# Patient Record
Sex: FEMALE | Race: WHITE | NOT HISPANIC OR LATINO | Employment: OTHER | URBAN - METROPOLITAN AREA
[De-identification: names, ages, dates, MRNs, and addresses within clinical notes are randomized per-mention and may not be internally consistent; named-entity substitution may affect disease eponyms.]

---

## 2017-02-10 ENCOUNTER — HOSPITAL ENCOUNTER (INPATIENT)
Facility: HOSPITAL | Age: 52
LOS: 1 days | Discharge: HOME | DRG: 392 | End: 2017-02-11
Attending: EMERGENCY MEDICINE | Admitting: HOSPITALIST
Payer: COMMERCIAL

## 2017-02-10 ENCOUNTER — APPOINTMENT (EMERGENCY)
Dept: CT IMAGING | Facility: HOSPITAL | Age: 52
DRG: 392 | End: 2017-02-10
Payer: COMMERCIAL

## 2017-02-10 ENCOUNTER — APPOINTMENT (EMERGENCY)
Dept: RADIOLOGY | Facility: HOSPITAL | Age: 52
DRG: 392 | End: 2017-02-10
Payer: COMMERCIAL

## 2017-02-10 DIAGNOSIS — R10.9 ABDOMINAL PAIN: Primary | ICD-10-CM

## 2017-02-10 PROBLEM — K52.9 ENTERITIS: Status: ACTIVE | Noted: 2017-02-10

## 2017-02-10 LAB
ALBUMIN SERPL BCP-MCNC: 3.8 G/DL (ref 3.5–5)
ALP SERPL-CCNC: 62 U/L (ref 46–116)
ALT SERPL W P-5'-P-CCNC: 23 U/L (ref 12–78)
ANION GAP SERPL CALCULATED.3IONS-SCNC: 16 MMOL/L (ref 4–13)
AST SERPL W P-5'-P-CCNC: 36 U/L (ref 5–45)
ATRIAL RATE: 58 BPM
BACTERIA UR QL AUTO: ABNORMAL /HPF
BASOPHILS # BLD AUTO: 0.04 THOUSANDS/ΜL (ref 0–0.1)
BASOPHILS NFR BLD AUTO: 0 % (ref 0–1)
BILIRUB SERPL-MCNC: 0.56 MG/DL (ref 0.2–1)
BILIRUB UR QL STRIP: NEGATIVE
BUN SERPL-MCNC: 16 MG/DL (ref 5–25)
CALCIUM SERPL-MCNC: 9 MG/DL (ref 8.3–10.1)
CHLORIDE SERPL-SCNC: 101 MMOL/L (ref 100–108)
CLARITY UR: CLEAR
CO2 SERPL-SCNC: 18 MMOL/L (ref 21–32)
COLOR UR: YELLOW
CREAT SERPL-MCNC: 0.6 MG/DL (ref 0.6–1.3)
EOSINOPHIL # BLD AUTO: 0.13 THOUSAND/ΜL (ref 0–0.61)
EOSINOPHIL NFR BLD AUTO: 1 % (ref 0–6)
ERYTHROCYTE [DISTWIDTH] IN BLOOD BY AUTOMATED COUNT: 13.4 % (ref 11.6–15.1)
GFR SERPL CREATININE-BSD FRML MDRD: >60 ML/MIN/1.73SQ M
GLUCOSE SERPL-MCNC: 130 MG/DL (ref 65–140)
GLUCOSE UR STRIP-MCNC: NEGATIVE MG/DL
HCT VFR BLD AUTO: 40.5 % (ref 34.8–46.1)
HGB BLD-MCNC: 14.3 G/DL (ref 11.5–15.4)
HGB UR QL STRIP.AUTO: ABNORMAL
KETONES UR STRIP-MCNC: ABNORMAL MG/DL
LACTATE SERPL-SCNC: 1.7 MMOL/L (ref 0.5–2)
LACTATE SERPL-SCNC: 2.5 MMOL/L (ref 0.5–2)
LEUKOCYTE ESTERASE UR QL STRIP: NEGATIVE
LIPASE SERPL-CCNC: 139 U/L (ref 73–393)
LYMPHOCYTES # BLD AUTO: 2.43 THOUSANDS/ΜL (ref 0.6–4.47)
LYMPHOCYTES NFR BLD AUTO: 21 % (ref 14–44)
MCH RBC QN AUTO: 32.1 PG (ref 26.8–34.3)
MCHC RBC AUTO-ENTMCNC: 35.3 G/DL (ref 31.4–37.4)
MCV RBC AUTO: 91 FL (ref 82–98)
MONOCYTES # BLD AUTO: 0.58 THOUSAND/ΜL (ref 0.17–1.22)
MONOCYTES NFR BLD AUTO: 5 % (ref 4–12)
NEUTROPHILS # BLD AUTO: 8.38 THOUSANDS/ΜL (ref 1.85–7.62)
NEUTS SEG NFR BLD AUTO: 73 % (ref 43–75)
NITRITE UR QL STRIP: NEGATIVE
NON-SQ EPI CELLS URNS QL MICRO: ABNORMAL /HPF
NRBC BLD AUTO-RTO: 0 /100 WBCS
P AXIS: 63 DEGREES
PH UR STRIP.AUTO: 7 [PH] (ref 4.5–8)
PLATELET # BLD AUTO: 333 THOUSANDS/UL (ref 149–390)
PMV BLD AUTO: 11.4 FL (ref 8.9–12.7)
POTASSIUM SERPL-SCNC: 4.5 MMOL/L (ref 3.5–5.3)
PR INTERVAL: 132 MS
PROT SERPL-MCNC: 7.3 G/DL (ref 6.4–8.2)
PROT UR STRIP-MCNC: NEGATIVE MG/DL
QRS AXIS: 61 DEGREES
QRSD INTERVAL: 72 MS
QT INTERVAL: 420 MS
QTC INTERVAL: 412 MS
RBC # BLD AUTO: 4.45 MILLION/UL (ref 3.81–5.12)
RBC #/AREA URNS AUTO: ABNORMAL /HPF
SODIUM SERPL-SCNC: 135 MMOL/L (ref 136–145)
SP GR UR STRIP.AUTO: 1.02 (ref 1–1.03)
SPECIMEN SOURCE: NORMAL
T WAVE AXIS: 40 DEGREES
TROPONIN I BLD-MCNC: 0 NG/ML (ref 0–0.08)
UROBILINOGEN UR QL STRIP.AUTO: 0.2 E.U./DL
VENTRICULAR RATE: 58 BPM
WBC # BLD AUTO: 11.56 THOUSAND/UL (ref 4.31–10.16)
WBC #/AREA URNS AUTO: ABNORMAL /HPF

## 2017-02-10 PROCEDURE — 96361 HYDRATE IV INFUSION ADD-ON: CPT

## 2017-02-10 PROCEDURE — 85025 COMPLETE CBC W/AUTO DIFF WBC: CPT | Performed by: EMERGENCY MEDICINE

## 2017-02-10 PROCEDURE — 81002 URINALYSIS NONAUTO W/O SCOPE: CPT | Performed by: EMERGENCY MEDICINE

## 2017-02-10 PROCEDURE — 80053 COMPREHEN METABOLIC PANEL: CPT | Performed by: EMERGENCY MEDICINE

## 2017-02-10 PROCEDURE — 96375 TX/PRO/DX INJ NEW DRUG ADDON: CPT

## 2017-02-10 PROCEDURE — 36415 COLL VENOUS BLD VENIPUNCTURE: CPT | Performed by: EMERGENCY MEDICINE

## 2017-02-10 PROCEDURE — 87086 URINE CULTURE/COLONY COUNT: CPT

## 2017-02-10 PROCEDURE — 83605 ASSAY OF LACTIC ACID: CPT | Performed by: EMERGENCY MEDICINE

## 2017-02-10 PROCEDURE — C9113 INJ PANTOPRAZOLE SODIUM, VIA: HCPCS | Performed by: PHYSICIAN ASSISTANT

## 2017-02-10 PROCEDURE — 71010 HB CHEST X-RAY 1 VIEW FRONTAL (PORTABLE): CPT

## 2017-02-10 PROCEDURE — 96374 THER/PROPH/DIAG INJ IV PUSH: CPT

## 2017-02-10 PROCEDURE — 83690 ASSAY OF LIPASE: CPT | Performed by: EMERGENCY MEDICINE

## 2017-02-10 PROCEDURE — 81001 URINALYSIS AUTO W/SCOPE: CPT

## 2017-02-10 PROCEDURE — 99285 EMERGENCY DEPT VISIT HI MDM: CPT

## 2017-02-10 PROCEDURE — 84484 ASSAY OF TROPONIN QUANT: CPT

## 2017-02-10 PROCEDURE — 74177 CT ABD & PELVIS W/CONTRAST: CPT

## 2017-02-10 PROCEDURE — 93005 ELECTROCARDIOGRAM TRACING: CPT | Performed by: EMERGENCY MEDICINE

## 2017-02-10 RX ORDER — ACETAMINOPHEN 325 MG/1
650 TABLET ORAL EVERY 6 HOURS PRN
Status: DISCONTINUED | OUTPATIENT
Start: 2017-02-10 | End: 2017-02-11 | Stop reason: HOSPADM

## 2017-02-10 RX ORDER — PANTOPRAZOLE SODIUM 40 MG/1
40 INJECTION, POWDER, FOR SOLUTION INTRAVENOUS EVERY 12 HOURS SCHEDULED
Status: DISCONTINUED | OUTPATIENT
Start: 2017-02-10 | End: 2017-02-11 | Stop reason: HOSPADM

## 2017-02-10 RX ORDER — ONDANSETRON 2 MG/ML
4 INJECTION INTRAMUSCULAR; INTRAVENOUS EVERY 4 HOURS PRN
Status: DISCONTINUED | OUTPATIENT
Start: 2017-02-10 | End: 2017-02-11 | Stop reason: HOSPADM

## 2017-02-10 RX ORDER — MAGNESIUM HYDROXIDE/ALUMINUM HYDROXICE/SIMETHICONE 120; 1200; 1200 MG/30ML; MG/30ML; MG/30ML
30 SUSPENSION ORAL EVERY 6 HOURS PRN
Status: DISCONTINUED | OUTPATIENT
Start: 2017-02-10 | End: 2017-02-11 | Stop reason: HOSPADM

## 2017-02-10 RX ORDER — SODIUM CHLORIDE 9 MG/ML
125 INJECTION, SOLUTION INTRAVENOUS CONTINUOUS
Status: DISCONTINUED | OUTPATIENT
Start: 2017-02-10 | End: 2017-02-10

## 2017-02-10 RX ORDER — MAGNESIUM HYDROXIDE/ALUMINUM HYDROXICE/SIMETHICONE 120; 1200; 1200 MG/30ML; MG/30ML; MG/30ML
30 SUSPENSION ORAL ONCE
Status: COMPLETED | OUTPATIENT
Start: 2017-02-10 | End: 2017-02-11

## 2017-02-10 RX ORDER — SODIUM CHLORIDE 9 MG/ML
100 INJECTION, SOLUTION INTRAVENOUS CONTINUOUS
Status: DISCONTINUED | OUTPATIENT
Start: 2017-02-10 | End: 2017-02-11 | Stop reason: HOSPADM

## 2017-02-10 RX ORDER — MORPHINE SULFATE 2 MG/ML
2 INJECTION, SOLUTION INTRAMUSCULAR; INTRAVENOUS EVERY 4 HOURS PRN
Status: DISCONTINUED | OUTPATIENT
Start: 2017-02-10 | End: 2017-02-10

## 2017-02-10 RX ORDER — MORPHINE SULFATE 2 MG/ML
1 INJECTION, SOLUTION INTRAMUSCULAR; INTRAVENOUS EVERY 4 HOURS PRN
Status: DISCONTINUED | OUTPATIENT
Start: 2017-02-10 | End: 2017-02-10

## 2017-02-10 RX ORDER — MORPHINE SULFATE 2 MG/ML
2 INJECTION, SOLUTION INTRAMUSCULAR; INTRAVENOUS EVERY 4 HOURS PRN
Status: DISCONTINUED | OUTPATIENT
Start: 2017-02-10 | End: 2017-02-11 | Stop reason: HOSPADM

## 2017-02-10 RX ORDER — ONDANSETRON 2 MG/ML
4 INJECTION INTRAMUSCULAR; INTRAVENOUS ONCE
Status: COMPLETED | OUTPATIENT
Start: 2017-02-10 | End: 2017-02-10

## 2017-02-10 RX ORDER — ONDANSETRON 2 MG/ML
4 INJECTION INTRAMUSCULAR; INTRAVENOUS EVERY 6 HOURS PRN
Status: DISCONTINUED | OUTPATIENT
Start: 2017-02-10 | End: 2017-02-10

## 2017-02-10 RX ADMIN — PANTOPRAZOLE SODIUM 40 MG: 40 INJECTION, POWDER, FOR SOLUTION INTRAVENOUS at 20:25

## 2017-02-10 RX ADMIN — ALUMINUM HYDROXIDE, MAGNESIUM HYDROXIDE, AND SIMETHICONE 30 ML: 200; 200; 20 SUSPENSION ORAL at 19:20

## 2017-02-10 RX ADMIN — MORPHINE SULFATE 1 MG: 2 INJECTION, SOLUTION INTRAMUSCULAR; INTRAVENOUS at 21:10

## 2017-02-10 RX ADMIN — ONDANSETRON 4 MG: 2 INJECTION INTRAMUSCULAR; INTRAVENOUS at 20:18

## 2017-02-10 RX ADMIN — SODIUM CHLORIDE 1000 ML: 0.9 INJECTION, SOLUTION INTRAVENOUS at 12:57

## 2017-02-10 RX ADMIN — PIPERACILLIN SODIUM AND TAZOBACTAM SODIUM 4.5 G: 4; .5 INJECTION, POWDER, LYOPHILIZED, FOR SOLUTION INTRAVENOUS at 17:08

## 2017-02-10 RX ADMIN — MORPHINE SULFATE 1 MG: 2 INJECTION, SOLUTION INTRAMUSCULAR; INTRAVENOUS at 20:09

## 2017-02-10 RX ADMIN — SODIUM CHLORIDE 125 ML/HR: 0.9 INJECTION, SOLUTION INTRAVENOUS at 17:07

## 2017-02-10 RX ADMIN — ONDANSETRON 4 MG: 2 INJECTION INTRAMUSCULAR; INTRAVENOUS at 12:56

## 2017-02-10 RX ADMIN — HYDROMORPHONE HYDROCHLORIDE 1 MG: 1 INJECTION, SOLUTION INTRAMUSCULAR; INTRAVENOUS; SUBCUTANEOUS at 21:27

## 2017-02-10 RX ADMIN — SODIUM CHLORIDE 100 ML/HR: 0.9 INJECTION, SOLUTION INTRAVENOUS at 18:13

## 2017-02-10 RX ADMIN — HYDROMORPHONE HYDROCHLORIDE 1 MG: 1 INJECTION, SOLUTION INTRAMUSCULAR; INTRAVENOUS; SUBCUTANEOUS at 12:50

## 2017-02-10 RX ADMIN — IOHEXOL 100 ML: 350 INJECTION, SOLUTION INTRAVENOUS at 14:13

## 2017-02-11 VITALS
WEIGHT: 125 LBS | RESPIRATION RATE: 18 BRPM | SYSTOLIC BLOOD PRESSURE: 101 MMHG | DIASTOLIC BLOOD PRESSURE: 62 MMHG | OXYGEN SATURATION: 100 % | HEART RATE: 58 BPM | TEMPERATURE: 98.2 F

## 2017-02-11 PROBLEM — K52.9 ENTERITIS: Status: RESOLVED | Noted: 2017-02-10 | Resolved: 2017-02-11

## 2017-02-11 LAB
ALBUMIN SERPL BCP-MCNC: 3.2 G/DL (ref 3.5–5)
ALP SERPL-CCNC: 56 U/L (ref 46–116)
ALT SERPL W P-5'-P-CCNC: 20 U/L (ref 12–78)
ANION GAP SERPL CALCULATED.3IONS-SCNC: 8 MMOL/L (ref 4–13)
AST SERPL W P-5'-P-CCNC: 12 U/L (ref 5–45)
BACTERIA UR CULT: NORMAL
BILIRUB SERPL-MCNC: 0.52 MG/DL (ref 0.2–1)
BUN SERPL-MCNC: 12 MG/DL (ref 5–25)
CALCIUM SERPL-MCNC: 7.8 MG/DL (ref 8.3–10.1)
CHLORIDE SERPL-SCNC: 106 MMOL/L (ref 100–108)
CO2 SERPL-SCNC: 23 MMOL/L (ref 21–32)
CREAT SERPL-MCNC: 0.56 MG/DL (ref 0.6–1.3)
ERYTHROCYTE [DISTWIDTH] IN BLOOD BY AUTOMATED COUNT: 13.9 % (ref 11.6–15.1)
GFR SERPL CREATININE-BSD FRML MDRD: >60 ML/MIN/1.73SQ M
GLUCOSE SERPL-MCNC: 90 MG/DL (ref 65–140)
HCT VFR BLD AUTO: 37.9 % (ref 34.8–46.1)
HGB BLD-MCNC: 13 G/DL (ref 11.5–15.4)
LACTATE SERPL-SCNC: 0.9 MMOL/L (ref 0.5–2)
MCH RBC QN AUTO: 31.5 PG (ref 26.8–34.3)
MCHC RBC AUTO-ENTMCNC: 34.3 G/DL (ref 31.4–37.4)
MCV RBC AUTO: 92 FL (ref 82–98)
PLATELET # BLD AUTO: 294 THOUSANDS/UL (ref 149–390)
PMV BLD AUTO: 11.5 FL (ref 8.9–12.7)
POTASSIUM SERPL-SCNC: 3.7 MMOL/L (ref 3.5–5.3)
PROT SERPL-MCNC: 6.1 G/DL (ref 6.4–8.2)
RBC # BLD AUTO: 4.13 MILLION/UL (ref 3.81–5.12)
SODIUM SERPL-SCNC: 137 MMOL/L (ref 136–145)
WBC # BLD AUTO: 9.66 THOUSAND/UL (ref 4.31–10.16)

## 2017-02-11 PROCEDURE — 80053 COMPREHEN METABOLIC PANEL: CPT | Performed by: PHYSICIAN ASSISTANT

## 2017-02-11 PROCEDURE — 85027 COMPLETE CBC AUTOMATED: CPT | Performed by: PHYSICIAN ASSISTANT

## 2017-02-11 PROCEDURE — C9113 INJ PANTOPRAZOLE SODIUM, VIA: HCPCS | Performed by: PHYSICIAN ASSISTANT

## 2017-02-11 PROCEDURE — 83605 ASSAY OF LACTIC ACID: CPT | Performed by: NURSE PRACTITIONER

## 2017-02-11 RX ORDER — ACETAMINOPHEN 325 MG/1
650 TABLET ORAL EVERY 6 HOURS PRN
Qty: 30 TABLET | Refills: 0 | Status: SHIPPED | OUTPATIENT
Start: 2017-02-11 | End: 2017-03-09

## 2017-02-11 RX ORDER — TRAMADOL HYDROCHLORIDE 50 MG/1
50 TABLET ORAL EVERY 6 HOURS PRN
Qty: 20 TABLET | Refills: 0 | Status: SHIPPED | OUTPATIENT
Start: 2017-02-11 | End: 2017-02-16

## 2017-02-11 RX ORDER — ONDANSETRON 4 MG/1
4 TABLET, FILM COATED ORAL EVERY 8 HOURS PRN
Qty: 20 TABLET | Refills: 0 | Status: SHIPPED | OUTPATIENT
Start: 2017-02-11 | End: 2017-03-09

## 2017-02-11 RX ADMIN — PIPERACILLIN AND TAZOBACTAM 3.38 G: 3; .375 INJECTION, POWDER, LYOPHILIZED, FOR SOLUTION INTRAVENOUS; PARENTERAL at 11:12

## 2017-02-11 RX ADMIN — LIDOCAINE HYDROCHLORIDE 15 ML: 20 SOLUTION ORAL; TOPICAL at 00:29

## 2017-02-11 RX ADMIN — PIPERACILLIN AND TAZOBACTAM 3.38 G: 3; .375 INJECTION, POWDER, LYOPHILIZED, FOR SOLUTION INTRAVENOUS; PARENTERAL at 00:18

## 2017-02-11 RX ADMIN — HYDROMORPHONE HYDROCHLORIDE 1 MG: 1 INJECTION, SOLUTION INTRAMUSCULAR; INTRAVENOUS; SUBCUTANEOUS at 01:35

## 2017-02-11 RX ADMIN — PANTOPRAZOLE SODIUM 40 MG: 40 INJECTION, POWDER, FOR SOLUTION INTRAVENOUS at 09:18

## 2017-02-11 RX ADMIN — PIPERACILLIN AND TAZOBACTAM 3.38 G: 3; .375 INJECTION, POWDER, LYOPHILIZED, FOR SOLUTION INTRAVENOUS; PARENTERAL at 05:35

## 2017-02-11 RX ADMIN — ALUMINUM HYDROXIDE, MAGNESIUM HYDROXIDE, AND SIMETHICONE 30 ML: 200; 200; 20 SUSPENSION ORAL at 00:29

## 2017-02-11 RX ADMIN — ONDANSETRON 4 MG: 2 INJECTION INTRAMUSCULAR; INTRAVENOUS at 00:15

## 2017-02-11 RX ADMIN — SODIUM CHLORIDE 100 ML/HR: 0.9 INJECTION, SOLUTION INTRAVENOUS at 04:21

## 2017-02-13 ENCOUNTER — ALLSCRIPTS OFFICE VISIT (OUTPATIENT)
Dept: OTHER | Facility: OTHER | Age: 52
End: 2017-02-13

## 2017-02-17 ENCOUNTER — HOSPITAL ENCOUNTER (OUTPATIENT)
Dept: CT IMAGING | Facility: HOSPITAL | Age: 52
Discharge: HOME | End: 2017-02-17
Payer: COMMERCIAL

## 2017-02-17 ENCOUNTER — TRANSCRIBE ORDERS (OUTPATIENT)
Dept: ADMINISTRATIVE | Facility: HOSPITAL | Age: 52
End: 2017-02-17

## 2017-02-17 DIAGNOSIS — R10.83 INFANTILE COLIC: Primary | ICD-10-CM

## 2017-02-17 DIAGNOSIS — R10.83 COLIC: ICD-10-CM

## 2017-02-17 DIAGNOSIS — R10.83 ABDOMINAL COLIC: ICD-10-CM

## 2017-02-17 PROCEDURE — 74177 CT ABD & PELVIS W/CONTRAST: CPT

## 2017-02-17 RX ADMIN — IOHEXOL 100 ML: 350 INJECTION, SOLUTION INTRAVENOUS at 14:26

## 2017-02-17 RX ADMIN — IOHEXOL 50 ML: 240 INJECTION, SOLUTION INTRATHECAL; INTRAVASCULAR; INTRAVENOUS; ORAL at 14:26

## 2017-02-22 ENCOUNTER — ALLSCRIPTS OFFICE VISIT (OUTPATIENT)
Dept: OTHER | Facility: OTHER | Age: 52
End: 2017-02-22

## 2017-03-12 ENCOUNTER — ANESTHESIA EVENT (OUTPATIENT)
Dept: PERIOP | Facility: HOSPITAL | Age: 52
End: 2017-03-12
Payer: COMMERCIAL

## 2017-03-13 ENCOUNTER — HOSPITAL ENCOUNTER (OUTPATIENT)
Facility: HOSPITAL | Age: 52
Setting detail: OUTPATIENT SURGERY
Discharge: HOME | End: 2017-03-13
Attending: SURGERY | Admitting: SURGERY
Payer: COMMERCIAL

## 2017-03-13 ENCOUNTER — ANESTHESIA (OUTPATIENT)
Dept: PERIOP | Facility: HOSPITAL | Age: 52
End: 2017-03-13
Payer: COMMERCIAL

## 2017-03-13 VITALS
OXYGEN SATURATION: 100 % | RESPIRATION RATE: 16 BRPM | SYSTOLIC BLOOD PRESSURE: 114 MMHG | HEART RATE: 95 BPM | DIASTOLIC BLOOD PRESSURE: 61 MMHG | BODY MASS INDEX: 23.6 KG/M2 | WEIGHT: 125 LBS | HEIGHT: 61 IN | TEMPERATURE: 98.3 F

## 2017-03-13 LAB
ABO GROUP BLD: NORMAL
BLD GP AB SCN SERPL QL: NEGATIVE
EXT PREGNANCY TEST URINE: NEGATIVE
RH BLD: POSITIVE

## 2017-03-13 PROCEDURE — 81025 URINE PREGNANCY TEST: CPT | Performed by: SURGERY

## 2017-03-13 PROCEDURE — 86850 RBC ANTIBODY SCREEN: CPT | Performed by: SURGERY

## 2017-03-13 PROCEDURE — 86901 BLOOD TYPING SEROLOGIC RH(D): CPT | Performed by: SURGERY

## 2017-03-13 PROCEDURE — 86900 BLOOD TYPING SEROLOGIC ABO: CPT | Performed by: SURGERY

## 2017-03-13 RX ORDER — HYDROCODONE BITARTRATE AND ACETAMINOPHEN 5; 325 MG/1; MG/1
1 TABLET ORAL EVERY 4 HOURS PRN
Status: DISCONTINUED | OUTPATIENT
Start: 2017-03-13 | End: 2017-03-13 | Stop reason: HOSPADM

## 2017-03-13 RX ORDER — BUPIVACAINE HYDROCHLORIDE AND EPINEPHRINE 2.5; 5 MG/ML; UG/ML
INJECTION, SOLUTION EPIDURAL; INFILTRATION; INTRACAUDAL; PERINEURAL AS NEEDED
Status: DISCONTINUED | OUTPATIENT
Start: 2017-03-13 | End: 2017-03-13 | Stop reason: HOSPADM

## 2017-03-13 RX ORDER — ONDANSETRON 2 MG/ML
INJECTION INTRAMUSCULAR; INTRAVENOUS AS NEEDED
Status: DISCONTINUED | OUTPATIENT
Start: 2017-03-13 | End: 2017-03-13 | Stop reason: SURG

## 2017-03-13 RX ORDER — PROPOFOL 10 MG/ML
INJECTION, EMULSION INTRAVENOUS AS NEEDED
Status: DISCONTINUED | OUTPATIENT
Start: 2017-03-13 | End: 2017-03-13 | Stop reason: SURG

## 2017-03-13 RX ORDER — FENTANYL CITRATE 50 UG/ML
INJECTION, SOLUTION INTRAMUSCULAR; INTRAVENOUS AS NEEDED
Status: DISCONTINUED | OUTPATIENT
Start: 2017-03-13 | End: 2017-03-13 | Stop reason: SURG

## 2017-03-13 RX ORDER — SODIUM CHLORIDE 9 MG/ML
125 INJECTION, SOLUTION INTRAVENOUS CONTINUOUS
Status: DISCONTINUED | OUTPATIENT
Start: 2017-03-13 | End: 2017-03-13 | Stop reason: HOSPADM

## 2017-03-13 RX ORDER — SODIUM CHLORIDE, SODIUM LACTATE, POTASSIUM CHLORIDE, CALCIUM CHLORIDE 600; 310; 30; 20 MG/100ML; MG/100ML; MG/100ML; MG/100ML
100 INJECTION, SOLUTION INTRAVENOUS CONTINUOUS
Status: DISCONTINUED | OUTPATIENT
Start: 2017-03-13 | End: 2017-03-13 | Stop reason: HOSPADM

## 2017-03-13 RX ORDER — EPHEDRINE SULFATE 50 MG/ML
INJECTION, SOLUTION INTRAVENOUS AS NEEDED
Status: DISCONTINUED | OUTPATIENT
Start: 2017-03-13 | End: 2017-03-13 | Stop reason: SURG

## 2017-03-13 RX ORDER — HYDROCODONE BITARTRATE AND ACETAMINOPHEN 5; 325 MG/1; MG/1
2 TABLET ORAL EVERY 6 HOURS PRN
Status: DISCONTINUED | OUTPATIENT
Start: 2017-03-13 | End: 2017-03-13 | Stop reason: HOSPADM

## 2017-03-13 RX ORDER — ROCURONIUM BROMIDE 10 MG/ML
INJECTION, SOLUTION INTRAVENOUS AS NEEDED
Status: DISCONTINUED | OUTPATIENT
Start: 2017-03-13 | End: 2017-03-13 | Stop reason: SURG

## 2017-03-13 RX ORDER — MAGNESIUM HYDROXIDE 1200 MG/15ML
LIQUID ORAL AS NEEDED
Status: DISCONTINUED | OUTPATIENT
Start: 2017-03-13 | End: 2017-03-13 | Stop reason: HOSPADM

## 2017-03-13 RX ORDER — MORPHINE SULFATE 2 MG/ML
2 INJECTION, SOLUTION INTRAMUSCULAR; INTRAVENOUS
Status: DISCONTINUED | OUTPATIENT
Start: 2017-03-13 | End: 2017-03-13 | Stop reason: HOSPADM

## 2017-03-13 RX ORDER — HYDROCODONE BITARTRATE AND ACETAMINOPHEN 5; 325 MG/1; MG/1
1-2 TABLET ORAL EVERY 6 HOURS PRN
Qty: 30 TABLET | Refills: 0 | Status: SHIPPED | OUTPATIENT
Start: 2017-03-13 | End: 2017-03-23

## 2017-03-13 RX ORDER — MIDAZOLAM HYDROCHLORIDE 1 MG/ML
INJECTION INTRAMUSCULAR; INTRAVENOUS AS NEEDED
Status: DISCONTINUED | OUTPATIENT
Start: 2017-03-13 | End: 2017-03-13 | Stop reason: SURG

## 2017-03-13 RX ORDER — HYDROMORPHONE HYDROCHLORIDE 2 MG/ML
INJECTION, SOLUTION INTRAMUSCULAR; INTRAVENOUS; SUBCUTANEOUS AS NEEDED
Status: DISCONTINUED | OUTPATIENT
Start: 2017-03-13 | End: 2017-03-13 | Stop reason: SURG

## 2017-03-13 RX ORDER — ONDANSETRON 2 MG/ML
4 INJECTION INTRAMUSCULAR; INTRAVENOUS ONCE
Status: DISCONTINUED | OUTPATIENT
Start: 2017-03-13 | End: 2017-03-13 | Stop reason: HOSPADM

## 2017-03-13 RX ADMIN — SODIUM CHLORIDE 125 ML/HR: 0.9 INJECTION, SOLUTION INTRAVENOUS at 06:30

## 2017-03-13 RX ADMIN — PROPOFOL 200 MG: 10 INJECTION, EMULSION INTRAVENOUS at 07:44

## 2017-03-13 RX ADMIN — ROCURONIUM BROMIDE 40 MG: 10 INJECTION, SOLUTION INTRAVENOUS at 07:44

## 2017-03-13 RX ADMIN — HYDROMORPHONE HYDROCHLORIDE 0.5 MG: 2 INJECTION, SOLUTION INTRAMUSCULAR; INTRAVENOUS; SUBCUTANEOUS at 08:07

## 2017-03-13 RX ADMIN — CEFAZOLIN SODIUM 1000 MG: 1 SOLUTION INTRAVENOUS at 07:49

## 2017-03-13 RX ADMIN — DEXAMETHASONE SODIUM PHOSPHATE 4 MG: 10 INJECTION INTRAMUSCULAR; INTRAVENOUS at 08:14

## 2017-03-13 RX ADMIN — EPHEDRINE SULFATE 10 MG: 50 INJECTION, SOLUTION INTRAMUSCULAR; INTRAVENOUS; SUBCUTANEOUS at 08:24

## 2017-03-13 RX ADMIN — ONDANSETRON HYDROCHLORIDE 8 MG: 2 INJECTION, SOLUTION INTRAVENOUS at 07:49

## 2017-03-13 RX ADMIN — FENTANYL CITRATE 100 MCG: 50 INJECTION, SOLUTION INTRAMUSCULAR; INTRAVENOUS at 07:44

## 2017-03-13 RX ADMIN — MIDAZOLAM HYDROCHLORIDE 2 MG: 1 INJECTION, SOLUTION INTRAMUSCULAR; INTRAVENOUS at 07:37

## 2017-03-29 ENCOUNTER — ALLSCRIPTS OFFICE VISIT (OUTPATIENT)
Dept: OTHER | Facility: OTHER | Age: 52
End: 2017-03-29

## 2017-05-02 ENCOUNTER — ALLSCRIPTS OFFICE VISIT (OUTPATIENT)
Dept: OTHER | Facility: OTHER | Age: 52
End: 2017-05-02
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
## 2017-05-02 DIAGNOSIS — L65.9 NONSCARRING HAIR LOSS: ICD-10-CM

## 2017-05-02 DIAGNOSIS — Z12.31 ENCOUNTER FOR SCREENING MAMMOGRAM FOR MALIGNANT NEOPLASM OF BREAST: ICD-10-CM

## 2017-05-09 LAB — PARASITE ID (HISTORICAL): NORMAL

## 2017-05-10 ENCOUNTER — GENERIC CONVERSION - ENCOUNTER (OUTPATIENT)
Dept: OTHER | Facility: OTHER | Age: 52
End: 2017-05-10

## 2017-05-10 ENCOUNTER — LAB CONVERSION - ENCOUNTER (OUTPATIENT)
Dept: OTHER | Facility: OTHER | Age: 52
End: 2017-05-10

## 2017-05-10 LAB — TICK -LYME (BORRELIA SPP) DNA, QL, (HISTORICAL): NOT DETECTED

## 2017-05-31 ENCOUNTER — ALLSCRIPTS OFFICE VISIT (OUTPATIENT)
Dept: OTHER | Facility: OTHER | Age: 52
End: 2017-05-31

## 2017-05-31 PROCEDURE — 87624 HPV HI-RISK TYP POOLED RSLT: CPT | Performed by: OBSTETRICS & GYNECOLOGY

## 2017-05-31 PROCEDURE — G0145 SCR C/V CYTO,THINLAYER,RESCR: HCPCS | Performed by: OBSTETRICS & GYNECOLOGY

## 2017-06-01 ENCOUNTER — LAB REQUISITION (OUTPATIENT)
Dept: LAB | Facility: HOSPITAL | Age: 52
End: 2017-06-01
Payer: COMMERCIAL

## 2017-06-01 DIAGNOSIS — Z01.419 ENCOUNTER FOR GYNECOLOGICAL EXAMINATION WITHOUT ABNORMAL FINDING: ICD-10-CM

## 2017-06-14 LAB
HPV RRNA GENITAL QL NAA+PROBE: NORMAL
LAB AP GYN PRIMARY INTERPRETATION: NORMAL
LAB AP LMP: NORMAL
Lab: NORMAL
PATH INTERP SPEC-IMP: NORMAL

## 2017-06-20 DIAGNOSIS — Z01.419 ENCOUNTER FOR GYNECOLOGICAL EXAMINATION WITHOUT ABNORMAL FINDING: ICD-10-CM

## 2017-08-24 ENCOUNTER — LAB REQUISITION (OUTPATIENT)
Dept: LAB | Facility: HOSPITAL | Age: 52
End: 2017-08-24
Payer: COMMERCIAL

## 2017-08-24 ENCOUNTER — ALLSCRIPTS OFFICE VISIT (OUTPATIENT)
Dept: OTHER | Facility: OTHER | Age: 52
End: 2017-08-24

## 2017-08-24 DIAGNOSIS — R87.619 ABNORMAL CYTOLOGICAL FINDING IN SPECIMEN FROM CERVIX UTERI: ICD-10-CM

## 2017-08-24 PROCEDURE — 88305 TISSUE EXAM BY PATHOLOGIST: CPT | Performed by: OBSTETRICS & GYNECOLOGY

## 2017-11-11 ENCOUNTER — OFFICE VISIT (OUTPATIENT)
Dept: URGENT CARE | Facility: MEDICAL CENTER | Age: 52
End: 2017-11-11
Payer: COMMERCIAL

## 2017-11-11 PROCEDURE — G0382 LEV 3 HOSP TYPE B ED VISIT: HCPCS

## 2017-11-11 PROCEDURE — 90715 TDAP VACCINE 7 YRS/> IM: CPT

## 2017-11-11 PROCEDURE — 12014 RPR F/E/E/N/L/M 5.1-7.5 CM: CPT

## 2017-11-11 PROCEDURE — S9083 URGENT CARE CENTER GLOBAL: HCPCS

## 2017-11-17 ENCOUNTER — OFFICE VISIT (OUTPATIENT)
Dept: URGENT CARE | Facility: MEDICAL CENTER | Age: 52
End: 2017-11-17
Payer: COMMERCIAL

## 2017-11-17 PROCEDURE — 99211 OFF/OP EST MAY X REQ PHY/QHP: CPT

## 2017-11-17 NOTE — PROGRESS NOTES
Assessment  1  Facial laceration (873 40) (S01 81XA)    Discussion/Summary  Discussion Summary:   Follow-up for suture removal in 5 days  Use bacitracin over the area  Medication Side Effects Reviewed: Possible side effects of new medications were reviewed with the patient/guardian today  Understands and agrees with treatment plan: The treatment plan was reviewed with the patient/guardian  The patient/guardian understands and agrees with the treatment plan   Counseling Documentation With Imm: The patient was counseled regarding instructions for management,-- prognosis,-- patient and family education,-- impressions  Chief Complaint    1  Skin Wound  Chief Complaint Free Text Note Form: Pt states was at [facility], was putting dog in cage and taking off proctor when dog became agitated and bit face below lower lip  History of Present Illness  HPI: Patient was at her dog sanctuary when the dog scraped across her right shin and bit her  Tetanus to be given  Vaccinations up to hold up to date  Hospital Based Practices Required Assessment:  Pain Assessment  the patient states they have pain  The pain is located in the lower face  The patient describes the pain as dull  (on a scale of 0 to 10, the patient rates the pain at 3 )       Review of Systems  ROS Reviewed:   ROS reviewed  Active Problems    1  Abdominal pain, colicky (971 7) (U53 47)   2  Atypical glandular cells of undetermined significance of cervix (795 01) (R87 619)   3  Breast pain (611 71) (N64 4)   4  Cervical cancer screening (V76 2) (Z12 4)   5  Dysmenorrhea (625 3) (N94 6)   6  Encounter for routine gynecological examination with Papanicolaou smear of cervix (V72 31,V76 2) (Z01 419)   7  Encounter for screening mammogram for malignant neoplasm of breast (V76 12) (Z12 31)   8  Endometrial polyp (621 0) (N84 0)   9  Hair loss (704 00) (L65 9)   10  Internal hernia (553 8) (K45 8)   11  Mastitis, acute (611 0) (N61 0)   12   Menopausal symptoms (627 2) (N95 1)   13  Metrorrhagia (626 6) (N92 1)   14  Motion sickness (994 6) (T75 3XXA)   15  Neck muscle spasm (728 85) (M62 838)   16  Neck pain (723 1) (M54 2)   17  Other screening mammogram (V76 12) (Z12 31)   18  Right shoulder pain (719 41) (M25 511)   19  Screening for colon cancer (V76 51) (Z12 11)   20  Status post laparoscopic surgery (V45 89) (Z98 890)   21  Status post umbilical hernia repair, follow-up exam (V67 09) (I58)   22  Thoracic myofascial strain (847 1) (S29 019A)   23  Tick bite (919 4,E906 4) [facility])    Past Medical History  1  History of Abnormal findings on diagnostic imaging of breast (793 89) (R92 8)   2  History of Angiolipoma (214 9) (D17 9)   3  History of Bunion, unspecified laterality   4  History of Dysfunction of left eustachian tube (381 81) (H69 82)   5  History of Encounter for screening colonoscopy (V76 51) (Z12 11)   6  History of Foot Pain (Soft Tissue) (729 5)   7  History of alopecia areata (V13 3) (Z87 2)   8  History of backache (V13 59) (Z87 39)   9  History of infectious mononucleosis (V12 09) (Z86 19)   10  History of pregnancy (V13 29)   11  History of vertigo (V12 49) (Z87 898)   12  History of Multiple joint pain (719 49) (M25 50)   13  History of Neck pain (723 1) (M54 2)   14  History of Plantar fasciitis (728 71) (M72 2)   15  History of Screening for osteoporosis (V82 81) (Z13 820)    Family History  Mother    1  Family history of Hypertension (V17 49)  Father    2  Family history of Hyperlipidemia   3  Family history of Hypertension (V17 49)  Daughter    4  Family history of Hypothyroidism  Sister    5  Family history of Hypertension (V17 49)  Maternal Grandmother    6  Family history of Stroke Syndrome (V17 1)  Maternal Aunt    7  Family history of Breast Cancer (V16 3)    Social History   · Being A Social Drinker   · Marital History - Currently    ·    · Never A Smoker    Surgical History    1   History of Bunion Correction By Cheilectomy   2  History of  Section   3  History of Complete Colonoscopy   4  History of Laparoscopy (Diagnostic)   5  History of Umbilical Hernia Repair - Over Age 3    Current Meds   1  Naproxen 500 MG Oral Tablet; TAKE 1 TABLET TWICE DAILY AS NEEDED; Therapy: 67UJT0218 to (Evaluate:2017)  Requested for: 06XLC0983; Last Rx:2017 Ordered   2  Transderm-Scop (1 5 MG) 1 MG/3DAYS Transdermal Patch 72 Hour; apply 1 patch every 72 hours; Therapy: 72Vgi5890 to (Last Rx:2017)  Requested for: 88Wnn5907 Ordered    Allergies    1  No Known Drug Allergies    Vitals  Signs   Recorded: 76KDG7052 05:56PM   Temperature: 98 2 F  Heart Rate: 76  Respiration: 20  Systolic: 476  Diastolic: 80  O2 Saturation: 100  Pain Scale: 3    Physical Exam   Constitutional  General appearance: No acute distress, well appearing and well nourished  Skin  Skin and subcutaneous tissue: Abnormal  -- Multiple superficial horizontal lacerations right lower chin, 1 laceration, 3 inches long that is deeper especially in the middle  Procedure   Procedure: wound repair  The wound was located on the and was 6 cm in length right side of the chin  The wound was simple  The wound involved the subcutaneous tissue  The wound was linear,-- had tissue loss-- and-- was clean, but-- did not have a foreign body  the neurovascular exam was normal   The site was prepped with Betadine and cleansed  Lidocaine 2 ml, 1%, with epinephrine was injected  Closure: The deep layer was closed with -0  The cutaneous layer was closed with 3 sutures of 6-0 nylon--   simple interrupted sutures were used for the skin closure  Dressing: an antibiotic ointment was applied  Patient Status:  the patient tolerated the procedure well   There were no complications      Signatures   Electronically signed by : Osvaldo Lazar, [facility]; 2017  6:25PM EST                       (Author)    Electronically signed by : JANELL Garcia ; 2017  3:44PM EST (Co-author)

## 2017-12-05 NOTE — PROGRESS NOTES
Assessment    1  Facial laceration (873 40) (S01 81XA)    Discussion/Summary  Discussion Summary:   Sutures removed  Follow-up when necessary  Understands and agrees with treatment plan: The treatment plan was reviewed with the patient/guardian  The patient/guardian understands and agrees with the treatment plan   Counseling Documentation With Imm: The patient was counseled regarding instructions for management, prognosis, patient and family education, impressions  Chief Complaint    1  Skin Wound  Chief Complaint Free Text Note Form: Patient here for suture removal        History of Present Illness  HPI: Patient here for suture removal  No complaints  Doing well   Hospital Based Practices Required Assessment:   Pain Assessment   the patient states they do not have pain  Abuse And Domestic Violence Screen   Domestic violence screen not done today  Reason DV Screen not done: not alone    Depression And Suicide Screen  Suicide screen not done today  Reason suicide screen not done: not alone  Prefered Language is  [place]  Primary Language is  English  Active Problems    1  Abdominal pain, colicky (108 4) (P19 44)   2  Atypical glandular cells of undetermined significance of cervix (795 01) (R87 619)   3  Breast pain (611 71) (N64 4)   4  Cervical cancer screening (V76 2) (Z12 4)   5  Dysmenorrhea (625 3) (N94 6)   6  Encounter for routine gynecological examination with Papanicolaou smear of cervix   (V72 31,V76 2) (Z01 419)   7  Encounter for screening mammogram for malignant neoplasm of breast (V76 12)   (Z12 31)   8  Endometrial polyp (621 0) (N84 0)   9  Facial laceration (873 40) (S01 81XA)   10  Hair loss (704 00) (L65 9)   11  Internal hernia (553 8) (K45 8)   12  Mastitis, acute (611 0) (N61 0)   13  Menopausal symptoms (627 2) (N95 1)   14  Metrorrhagia (626 6) (N92 1)   15  Motion sickness (994 6) (T75 3XXA)   16  Neck muscle spasm (728 85) (M62 838)   17   Neck pain (723 1) (M54 2) 18  Other screening mammogram (V76 12) (Z12 31)   19  Right shoulder pain (719 41) (M25 511)   20  Screening for colon cancer (V76 51) (Z12 11)   21  Status post laparoscopic surgery (V45 89) (Z98 890)   22  Status post umbilical hernia repair, follow-up exam (V67 09) (A68)   23  Thoracic myofascial strain (847 1) (S29 019A)   24  Tick bite (919 4,E906 4) [facility])    Past Medical History    1  History of Abnormal findings on diagnostic imaging of breast (793 89) (R92 8)   2  History of Angiolipoma (214 9) (D17 9)   3  History of Bunion, unspecified laterality   4  History of Dysfunction of left eustachian tube (381 81) (H69 82)   5  History of Encounter for screening colonoscopy (V76 51) (Z12 11)   6  History of Foot Pain (Soft Tissue) (729 5)   7  History of alopecia areata (V13 3) (Z87 2)   8  History of backache (V13 59) (Z87 39)   9  History of infectious mononucleosis (V12 09) (Z86 19)   10  History of pregnancy (V13 29)   11  History of vertigo (V12 49) (Z87 898)   12  History of Multiple joint pain (719 49) (M25 50)   13  History of Neck pain (723 1) (M54 2)   14  History of Plantar fasciitis (728 71) (M72 2)   15  History of Screening for osteoporosis (V82 81) (Z13 820)    Family History  Mother    1  Family history of Hypertension (V17 49)  Father    2  Family history of Hyperlipidemia   3  Family history of Hypertension (V17 49)  Daughter    4  Family history of Hypothyroidism  Sister    5  Family history of Hypertension (V17 49)  Maternal Grandmother    6  Family history of Stroke Syndrome (V17 1)  Maternal Aunt    7  Family history of Breast Cancer (V16 3)    Social History    · Being A Social Drinker   · Marital History - Currently    ·    · Never A Smoker    Surgical History    1  History of Bunion Correction By Cheilectomy   2  History of  Section   3  History of Complete Colonoscopy   4  History of Laparoscopy (Diagnostic)   5   History of Umbilical Hernia Repair - Over Age 3    Current Meds   1  Naproxen 500 MG Oral Tablet; TAKE 1 TABLET TWICE DAILY AS NEEDED; Therapy: 47TDQ8937 to (Evaluate:95Uvj5711)  Requested for: 25QOH0318; Last   Rx:13Jun2017 Ordered   2  Transderm-Scop (1 5 MG) 1 MG/3DAYS Transdermal Patch 72 Hour; apply 1 patch every   72 hours; Therapy: 09Dfy7205 to (Last Rx:11Sep2017)  Requested for: 93Qep4811 Ordered    Allergies    1  No Known Drug Allergies    Vitals  Signs   Recorded: 74PZR2637 03:02PM   Temperature: 97 2 F, Tympanic  Heart Rate: 81  Respiration: 16  Systolic: 233  Diastolic: 60  Height: 5 ft 0 5 in  Weight: 124 lb   BMI Calculated: 23 82  BSA Calculated: 1 53  O2 Saturation: 99  Pain Scale: 0    Physical Exam    Skin   Examination of the skin for lesions: Abnormal   Right facial wound healing nicely  No signs of infections   3 sutures ready for removal       Signatures   Electronically signed by : Nita Forte [facility]; Nov 17 2017  3:16PM EST                       (Author)    Electronically signed by : JANELL Gibbs ; Nov 30 2017 12:15PM EST                       (Co-author)

## 2018-01-10 ENCOUNTER — ALLSCRIPTS OFFICE VISIT (OUTPATIENT)
Dept: OTHER | Facility: OTHER | Age: 53
End: 2018-01-10

## 2018-01-12 VITALS
SYSTOLIC BLOOD PRESSURE: 132 MMHG | HEIGHT: 61 IN | BODY MASS INDEX: 23.43 KG/M2 | WEIGHT: 124.13 LBS | DIASTOLIC BLOOD PRESSURE: 86 MMHG

## 2018-01-12 NOTE — PROGRESS NOTES
Assessment   1  Allergic rhinitis, unspecified chronicity, unspecified seasonality, unspecified trigger     (477 9) (J30 9)   2  Cough (786 2) (R05)    Plan   Allergic rhinitis, unspecified chronicity, unspecified seasonality, unspecified trigger    · Montelukast Sodium 10 MG Oral Tablet; TAKE 1 TABLET BY MOUTH DAILY  Cough    · Promethazine-Codeine 6 25-10 MG/5ML Oral Syrup; one or two teaspoons at    bedtime prn cough  Health Maintenance    · Tdap (Boostrix); INJECT 0 5  ML Intramuscular; To Be Done: 00EVU4037    Discussion/Summary      42-year-old white female here for symptoms of ongoing cough and congestion nasal discharge likely prompted from a common cold  There may be some allergy component to this and to that end we have prescribed montelukast and patient will  Flonase over-the-counter to use  Also prescribed promethazine with codeine for her nighttime cough  Patient will call if there is no improvement  did have the flu shot this season as well as the update on the Adacel  The patient was counseled regarding instructions for management,-- prognosis,-- impressions,-- importance of compliance with treatment  total time of encounter was 20 minutes-- and-- greater than 50% minutes was spent counseling  The treatment plan was reviewed with the patient/guardian  The patient/guardian understands and agrees with the treatment plan      Chief Complaint   cold sx - ongoing for 3 weeks now - coughing, congestion, biggest thing is she keeps waking up in the night because of the coughing and post nasal drip      History of Present Illness   Rhinitis (Brief): The patient is being seen for an initial evaluation of an existing diagnosis of rhinitis  Symptoms include:  nasal drainage-- and-- throat itching--       The patient presents with complaints of gradual onset of intermittent episodes of moderate cough, described as non-productive  Episodes started about 3 weeks ago   She is currently experiencing cough  Her symptoms are reportedly caused by cold symptoms  Symptoms are not improved by lying down and cough medicine  Symptoms are unchanged  HPI: 57-year-old patient who complains of upper respiratory symptoms that began as a common cold  Overall the symptoms are improved but she has nasal congestion postnasal drip which causes her to cough and it is worse at nighttime  Review of Systems        Constitutional: no fever-- and-- no chills  ENT: as noted in HPI  Cardiovascular: no chest pain-- and-- no palpitations  Respiratory: as noted in HPI  Gastrointestinal: no constipation  Genitourinary: no incontinence  Musculoskeletal: no myalgias  Integumentary: no rashes  ROS reviewed  Active Problems   1  Abdominal pain, colicky (988 2) (X94 16)   2  Atypical glandular cells of undetermined significance of cervix (795 01) (R87 619)   3  Breast pain (611 71) (N64 4)   4  Cervical cancer screening (V76 2) (Z12 4)   5  Dysmenorrhea (625 3) (N94 6)   6  Encounter for routine gynecological examination with Papanicolaou smear of cervix     (V72 31,V76 2) (Z01 419)   7  Encounter for screening mammogram for malignant neoplasm of breast (V76 12)     (Z12 31)   8  Endometrial polyp (621 0) (N84 0)   9  Facial laceration (873 40) (S01 81XA)   10  Hair loss (704 00) (L65 9)   11  Internal hernia (553 8) (K45 8)   12  Mastitis, acute (611 0) (N61 0)   13  Menopausal symptoms (627 2) (N95 1)   14  Metrorrhagia (626 6) (N92 1)   15  Motion sickness (994 6) (T75 3XXA)   16  Neck muscle spasm (728 85) (M62 838)   17  Neck pain (723 1) (M54 2)   18  Other screening mammogram (V76 12) (Z12 31)   19  Right shoulder pain (719 41) (M25 511)   20  Screening for colon cancer (V76 51) (Z12 11)   21  Status post laparoscopic surgery (V45 89) (Z98 890)   22  Status post umbilical hernia repair, follow-up exam (V67 09) (B22)   23  Thoracic myofascial strain (847 1) (S29 019A)   24   Tick bite (919 4,E906 4) [facility]    Past Medical History   1  History of Abnormal findings on diagnostic imaging of breast (793 89) (R92 8)   2  History of Angiolipoma (214 9) (D17 9)   3  History of Bunion, unspecified laterality   4  History of Dysfunction of left eustachian tube (381 81) (H69 82)   5  History of Encounter for screening colonoscopy (V76 51) (Z12 11)   6  History of Foot Pain (Soft Tissue) (729 5)   7  History of alopecia areata (V13 3) (Z87 2)   8  History of backache (V13 59) (Z87 39)   9  History of infectious mononucleosis (V12 09) (Z86 19)   10  History of pregnancy (V13 29)   11  History of vertigo (V12 49) (Z87 898)   12  History of Multiple joint pain (719 49) (M25 50)   13  History of Neck pain (723 1) (M54 2)   14  History of Plantar fasciitis (728 71) (M72 2)   15  History of Screening for osteoporosis (V82 81) (G91 813)  Active Problems And Past Medical History Reviewed: The active problems and past medical history were reviewed and updated today  Family History   Mother    1  Family history of Hypertension (V17 49)  Father    2  Family history of Hyperlipidemia   3  Family history of Hypertension (V17 49)  Daughter    4  Family history of Hypothyroidism  Sister    5  Family history of Hypertension (V17 49)  Maternal Grandmother    6  Family history of Stroke Syndrome (V17 1)  Maternal Aunt    7  Family history of Breast Cancer (V16 3)  Family History Reviewed: The family history was reviewed and updated today  Social History    · Being A Social Drinker   · Marital History - Currently    ·    · Never A Smoker  The social history was reviewed and updated today  Surgical History   1  History of Bunion Correction By Cheilectomy   2  History of  Section   3  History of Complete Colonoscopy   4  History of Laparoscopy (Diagnostic)   5  History of Umbilical Hernia Repair - Over Age 3  Surgical History Reviewed:     The surgical history was reviewed and updated today  Current Meds      The medication list was reviewed and updated today  Allergies   1  No Known Drug Allergies    Vitals    ** Printed in Appendix #1 below  Physical Exam        Constitutional      General appearance: No acute distress, well appearing and well nourished  well developed,-- within normal limits of ideal weight-- and-- appearance reflects stated age  Ears, Nose, Mouth, and Throat      Otoscopic examination: Tympanic membranes translucent with normal light reflex  Canals patent without erythema  Nasal mucosa, septum, and turbinates: Abnormal   There was a mucoid discharge from both nares  The bilateral nasal mucosa was boggy  Oropharynx: Abnormal   The posterior pharynx Clear postnasal drip, but-- was not erythematous  Pulmonary      Auscultation of lungs: Clear to auscultation  Cardiovascular      Auscultation of heart: Normal rate and rhythm, normal S1 and S2, without murmurs  Abdomen      Abdomen: Non-tender, no masses  Liver and spleen: No hepatomegaly or splenomegaly  Musculoskeletal      Gait and station: Normal        Neurologic      Sensation: No sensory loss         Psychiatric      Orientation to person, place, and time: Normal        Mood and affect: Normal           Signatures    Electronically signed by : Ty Bella DO; 4007 12:03PM EST                       (Author)     Appendix #1          Patient: Norbert Jarvis ; : 1965; MRN: [id number]           Recorded: 06XBN5190 01:51PM Recorded: 72OAL1850 01:46PM Recorded: 40GNZ1511 01:44PM   Temperature  17 0 F    Systolic 503     Diastolic 78     Height   5 ft 0 5 in   Weight   121 lb    BMI Calculated   23 24   BSA Calculated   1 52

## 2018-01-13 VITALS
SYSTOLIC BLOOD PRESSURE: 120 MMHG | HEART RATE: 76 BPM | WEIGHT: 124 LBS | RESPIRATION RATE: 16 BRPM | TEMPERATURE: 98.6 F | BODY MASS INDEX: 23.41 KG/M2 | HEIGHT: 61 IN | DIASTOLIC BLOOD PRESSURE: 84 MMHG

## 2018-01-13 VITALS
BODY MASS INDEX: 23.29 KG/M2 | WEIGHT: 123.38 LBS | SYSTOLIC BLOOD PRESSURE: 110 MMHG | HEIGHT: 61 IN | DIASTOLIC BLOOD PRESSURE: 60 MMHG

## 2018-01-13 VITALS
DIASTOLIC BLOOD PRESSURE: 74 MMHG | BODY MASS INDEX: 23.61 KG/M2 | RESPIRATION RATE: 16 BRPM | WEIGHT: 125.04 LBS | HEIGHT: 61 IN | HEART RATE: 76 BPM | TEMPERATURE: 98.8 F | SYSTOLIC BLOOD PRESSURE: 120 MMHG

## 2018-01-14 ENCOUNTER — OFFICE VISIT (OUTPATIENT)
Dept: URGENT CARE | Facility: MEDICAL CENTER | Age: 53
End: 2018-01-14
Payer: COMMERCIAL

## 2018-01-14 ENCOUNTER — GENERIC CONVERSION - ENCOUNTER (OUTPATIENT)
Dept: URGENT CARE | Facility: MEDICAL CENTER | Age: 53
End: 2018-01-14

## 2018-01-14 VITALS
SYSTOLIC BLOOD PRESSURE: 132 MMHG | BODY MASS INDEX: 23.48 KG/M2 | HEIGHT: 61 IN | WEIGHT: 124.38 LBS | DIASTOLIC BLOOD PRESSURE: 86 MMHG

## 2018-01-14 VITALS
HEIGHT: 61 IN | DIASTOLIC BLOOD PRESSURE: 80 MMHG | BODY MASS INDEX: 23.22 KG/M2 | WEIGHT: 123 LBS | SYSTOLIC BLOOD PRESSURE: 112 MMHG

## 2018-01-14 PROCEDURE — 99213 OFFICE O/P EST LOW 20 MIN: CPT

## 2018-01-15 NOTE — RESULT NOTES
Verified Results  LYME DISEASE (BORRELIA SPP) DNA, QL, TICK [id number] 12:00AM Umm Sung     Test Name Result Flag Reference   LYME DISEASE (BORRELIA SPP) DNA, QL, TICK NOT DETECTED     REFERENCE RANGE: NOT DETECTED     This test was developed and its analytical performance  characteristics have been determined by [facility]  Infectious Disease  It has not been cleared or approved  by FDA  This assay has been validated pursuant to the  CLIA regulations and is used for clinical purposes

## 2018-01-19 NOTE — PROGRESS NOTES
Assessment   1  Acute sinusitis (461 9) (J01 90)    Plan   Acute sinusitis    · Start: Amoxicillin-Pot Clavulanate 875-125 MG Oral Tablet (Augmentin); TAKE 1 TABLET    EVERY 12 HOURS WITH MEALS UNTIL GONE    Discussion/Summary   Discussion Summary:    1  Sinusitis Augmentin as prescribed with food flonase nasal spray as directed fluids at home can alternate motrin and tylenol as needed saline nasal spray as needed H20 gargles/lozenges as needed using a humidifier in your bedroom   with your primary care physician for re-evaluation within 1 week you have worsening symptoms or any signs of distress please go to the nearest ER      Medication Side Effects Reviewed: Possible side effects of new medications were reviewed with the patient/guardian today  Understands and agrees with treatment plan: The treatment plan was reviewed with the patient/guardian  The patient/guardian understands and agrees with the treatment plan      Chief Complaint   1  Cold Symptoms  Chief Complaint Free Text Note Form: Pt states that she was seen Wed by PCP and prescribed singulair and cough med w codeine which worked for the first 24hr but presents today with worsening symptoms  C/o HA, aches, chills, sinus pressure, blocked ears, non productive cough and congestion  History of Present Illness   HPI: The patient presents today for an evaluation of cold symptoms and nasal congestion on and off since before christmas  The patient saw PCP on Wednesday and was prescribed singulair and and promethazine with codein for nighttime  The patient states that her symptoms have gotten much worse since Friday night  The patient has had worsening nasal congestion and post nasal drip  She admits to increased cough from post nasal drip  The patient also admits to feeling achy  She also admits to having some chills last night  Hospital Based Practices Required Assessment:      Pain Assessment      the patient states they have pain   The pain is located in the HA  (on a scale of 0 to 10, the patient rates the pain at 5 )      Abuse And Domestic Violence Screen   Domestic violence screen not done today  Reason DV Screen not done:  in room       Depression And Suicide Screen  Suicide screen not done today  -- Reason suicide screen not done:  in room  Prefered Language is  english  Primary Language is  english  Review of Systems   Focused-Female:      Constitutional: chills, but-- no fever  ENT: sore throat-- and-- nasal discharge, but-- no earache  Cardiovascular: no chest pain  Respiratory: cough, but-- no shortness of breath  Neurological: no headache  ROS Reviewed:    ROS reviewed  Active Problems   1  Abdominal pain, colicky (531 8) (U52 91)  2  Allergic rhinitis, unspecified chronicity, unspecified seasonality, unspecified trigger     (477 9) (J30 9)  3  Atypical glandular cells of undetermined significance of cervix (795 01) (R87 619)  4  Breast pain (611 71) (N64 4)  5  Cervical cancer screening (V76 2) (Z12 4)  6  Cough (786 2) (R05)  7  Dysmenorrhea (625 3) (N94 6)  8  Encounter for routine gynecological examination with Papanicolaou smear of cervix     (V72 31,V76 2) (Z01 419)  9  Encounter for screening mammogram for malignant neoplasm of breast (V76 12)     (Z12 31)  10  Endometrial polyp (621 0) (N84 0)  11  Facial laceration (873 40) (S01 81XA)  12  Hair loss (704 00) (L65 9)  13  Internal hernia (553 8) (K45 8)  14  Mastitis, acute (611 0) (N61 0)  15  Menopausal symptoms (627 2) (N95 1)  16  Metrorrhagia (626 6) (N92 1)  17  Motion sickness (994 6) (T75 3XXA)  18  Neck muscle spasm (728 85) (M62 838)  19  Neck pain (723 1) (M54 2)  20  Other screening mammogram (V76 12) (Z12 31)  21  Right shoulder pain (719 41) (M25 511)  22  Screening for colon cancer (V76 51) (Z12 11)  23  Status post laparoscopic surgery (V45 89) (Z98 890)  24   Status post umbilical hernia repair, follow-up exam (V67 09) (Z09)  25  Thoracic myofascial strain (847 1) (S29 019A)  26  Tick bite (919 4,E906 4) [facility])    Past Medical History   1  History of Abnormal findings on diagnostic imaging of breast (793 89) (R92 8)  2  History of Angiolipoma (214 9) (D17 9)  3  History of Bunion, unspecified laterality  4  History of Dysfunction of left eustachian tube (381 81) (H69 82)  5  History of Encounter for screening colonoscopy (V76 51) (Z12 11)  6  History of Foot Pain (Soft Tissue) (729 5)  7  History of alopecia areata (V13 3) (Z87 2)  8  History of backache (V13 59) (Z87 39)  9  History of infectious mononucleosis (V12 09) (Z86 19)  10  History of pregnancy (V13 29)  11  History of vertigo (V12 49) (Z87 898)  12  History of Multiple joint pain (719 49) (M25 50)  13  History of Neck pain (723 1) (M54 2)  14  History of Plantar fasciitis (728 71) (M72 2)  15  History of Screening for osteoporosis (V82 81) (I57 383)  Active Problems And Past Medical History Reviewed: The active problems and past medical history were reviewed and updated today  Family History   Mother   1  Family history of Hypertension (V17 49)  Father   2  Family history of Hyperlipidemia  3  Family history of Hypertension (V17 49)  Daughter   4  Family history of Hypothyroidism  Sister   5  Family history of Hypertension (V17 49)  Maternal Grandmother   6  Family history of Stroke Syndrome (V17 1)  Maternal Aunt   7  Family history of Breast Cancer (V16 3)  Family History Reviewed: The family history was reviewed and updated today  Social History    · Being A Social Drinker   · Marital History - Currently    ·    · Never A Smoker  Social History Reviewed: The social history was reviewed and updated today  The social history was reviewed and is unchanged  Surgical History   1  History of Bunion Correction By Cheilectomy  2  History of  Section  3  History of Complete Colonoscopy  4   History of Laparoscopy (Diagnostic)  5  History of Umbilical Hernia Repair - Over Age 3  Surgical History Reviewed: The surgical history was reviewed and updated today  Current Meds   1  Montelukast Sodium 10 MG Oral Tablet; TAKE 1 TABLET BY MOUTH DAILY; Therapy: 35OTQ5166 to (Last Rx:10Jan2018)  Requested for: 99GMB4528 Ordered  2  Promethazine-Codeine 6 25-10 MG/5ML Oral Syrup; one or two teaspoons at bedtime     prn cough; Therapy: 29UHY2070 to (Last Rx:10Jan2018) Ordered  Medication List Reviewed: The medication list was reviewed and updated today  Allergies   1  No Known Drug Allergies    Vitals   Signs   Recorded: 76OSG8179 09:09AM   Temperature: 99 9 F, Tympanic  Heart Rate: 104  Respiration: 16  Systolic: 375, RUE, Sitting  Diastolic: 72, RUE, Sitting  O2 Saturation: 100  Pain Scale: 5    Physical Exam        Constitutional      General appearance: No acute distress, well appearing and well nourished  Eyes      Conjunctiva and lids: No swelling, erythema or discharge  Pupils and irises: Equal, round and reactive to light  Ears, Nose, Mouth, and Throat      External inspection of ears and nose: Normal        Otoscopic examination: Tympanic membranes translucent with normal light reflex  Canals patent without erythema  Nasal mucosa, septum, and turbinates: Abnormal   There was clear rhinorrhea from both nares  The bilateral nasal mucosa was edematous-- and-- red  Oropharynx: Abnormal  -- cobblestoning of posterior pharynx  Pulmonary      Respiratory effort: No increased work of breathing or signs of respiratory distress  Auscultation of lungs: Clear to auscultation  Cardiovascular      Auscultation of heart: Normal rate and rhythm, normal S1 and S2, without murmurs  Lymphatic      Palpation of lymph nodes in neck: No lymphadenopathy  Skin      Skin and subcutaneous tissue: Normal without rashes or lesions         Psychiatric      Orientation to person, place, and time: Normal        Additional Exam:  Sinuses: Frontal and maxillary sinuses are TTP  Message   Return to work or school:    Jenna Roman is under my professional care   She was seen in my office on 1/14/18    She is able to return to work on  1/16/18             Signatures    Electronically signed by : Kentrell Robles, [facility]; Jan 14 2018  9:26AM EST                       (Author)     Electronically signed by : JANELL Sotelo ; Jan 18 2018  3:25PM EST                       (Co-author)

## 2018-01-23 VITALS
DIASTOLIC BLOOD PRESSURE: 78 MMHG | HEIGHT: 61 IN | SYSTOLIC BLOOD PRESSURE: 128 MMHG | WEIGHT: 121 LBS | TEMPERATURE: 98.7 F | BODY MASS INDEX: 22.84 KG/M2

## 2018-01-23 VITALS
OXYGEN SATURATION: 100 % | DIASTOLIC BLOOD PRESSURE: 72 MMHG | RESPIRATION RATE: 16 BRPM | SYSTOLIC BLOOD PRESSURE: 126 MMHG | HEART RATE: 104 BPM | TEMPERATURE: 99.9 F

## 2018-01-30 ENCOUNTER — HOSPITAL ENCOUNTER (OUTPATIENT)
Dept: ULTRASOUND IMAGING | Facility: CLINIC | Age: 53
Discharge: HOME | End: 2018-01-30
Payer: COMMERCIAL

## 2018-01-30 ENCOUNTER — HOSPITAL ENCOUNTER (OUTPATIENT)
Dept: MAMMOGRAPHY | Facility: CLINIC | Age: 53
Discharge: HOME | End: 2018-01-30
Payer: COMMERCIAL

## 2018-01-30 ENCOUNTER — TRANSCRIBE ORDERS (OUTPATIENT)
Dept: MAMMOGRAPHY | Facility: CLINIC | Age: 53
End: 2018-01-30

## 2018-01-30 ENCOUNTER — HOSPITAL ENCOUNTER (OUTPATIENT)
Dept: RADIOLOGY | Facility: HOSPITAL | Age: 53
Discharge: HOME | End: 2018-01-30

## 2018-01-30 DIAGNOSIS — Z12.31 ENCOUNTER FOR SCREENING MAMMOGRAM FOR MALIGNANT NEOPLASM OF BREAST: ICD-10-CM

## 2018-01-30 DIAGNOSIS — Z12.39 SCREENING BREAST EXAMINATION: Primary | ICD-10-CM

## 2018-01-30 DIAGNOSIS — R92.8 OTHER ABNORMAL AND INCONCLUSIVE FINDINGS ON DIAGNOSTIC IMAGING OF BREAST: ICD-10-CM

## 2018-01-30 PROCEDURE — 77067 SCR MAMMO BI INCL CAD: CPT

## 2018-01-30 PROCEDURE — 77063 BREAST TOMOSYNTHESIS BI: CPT

## 2018-01-30 PROCEDURE — 76642 ULTRASOUND BREAST LIMITED: CPT

## 2018-02-28 NOTE — MISCELLANEOUS
Message  Return to work or school:   Lisa Chisholm is under my professional care   She was seen in my office on 1/14/18   She is able to return to work on  1/16/18            Signatures   Electronically signed by : Piedad Litten, [facility]; Jan 14 2018  9:26AM EST                       (Author)    Electronically signed by : Piedad Litten, [facility]; Jan 14 2018  9:53AM EST                       (Author)

## 2018-10-15 ENCOUNTER — HOSPITAL ENCOUNTER (OUTPATIENT)
Dept: ULTRASOUND IMAGING | Facility: CLINIC | Age: 53
Discharge: HOME | End: 2018-10-15
Payer: COMMERCIAL

## 2018-10-15 ENCOUNTER — HOSPITAL ENCOUNTER (OUTPATIENT)
Dept: MAMMOGRAPHY | Facility: CLINIC | Age: 53
Discharge: HOME | End: 2018-10-15
Payer: COMMERCIAL

## 2018-10-15 PROCEDURE — 77065 DX MAMMO INCL CAD UNI: CPT

## 2018-10-15 PROCEDURE — 76642 ULTRASOUND BREAST LIMITED: CPT

## 2018-10-15 PROCEDURE — G0279 TOMOSYNTHESIS, MAMMO: HCPCS

## 2018-10-17 ENCOUNTER — HOSPITAL ENCOUNTER (OUTPATIENT)
Dept: ULTRASOUND IMAGING | Facility: CLINIC | Age: 53
Discharge: HOME | End: 2018-10-17

## 2018-10-17 ENCOUNTER — HOSPITAL ENCOUNTER (OUTPATIENT)
Dept: MAMMOGRAPHY | Facility: CLINIC | Age: 53
Discharge: HOME | End: 2018-10-17

## 2018-10-17 DIAGNOSIS — R92.8 ABNORMAL MAMMOGRAM: ICD-10-CM

## 2019-08-05 ENCOUNTER — HOSPITAL ENCOUNTER (OUTPATIENT)
Dept: ULTRASOUND IMAGING | Facility: CLINIC | Age: 54
Discharge: HOME | End: 2019-08-05
Payer: COMMERCIAL

## 2019-08-05 ENCOUNTER — HOSPITAL ENCOUNTER (OUTPATIENT)
Dept: MAMMOGRAPHY | Facility: CLINIC | Age: 54
Discharge: HOME | End: 2019-08-05
Payer: COMMERCIAL

## 2019-08-05 ENCOUNTER — TRANSCRIBE ORDERS (OUTPATIENT)
Dept: ADMINISTRATIVE | Facility: HOSPITAL | Age: 54
End: 2019-08-05

## 2019-08-05 VITALS — HEIGHT: 61 IN | WEIGHT: 123 LBS | BODY MASS INDEX: 23.22 KG/M2

## 2019-08-05 DIAGNOSIS — R92.8 ABNORMAL MAMMOGRAM: Primary | ICD-10-CM

## 2019-08-05 DIAGNOSIS — R92.8 ABNORMAL MAMMOGRAM: ICD-10-CM

## 2019-08-05 PROCEDURE — G0279 TOMOSYNTHESIS, MAMMO: HCPCS

## 2019-08-05 PROCEDURE — 76642 ULTRASOUND BREAST LIMITED: CPT

## 2019-08-05 PROCEDURE — 77066 DX MAMMO INCL CAD BI: CPT

## 2019-11-14 ENCOUNTER — ANNUAL EXAM (OUTPATIENT)
Dept: GYNECOLOGY | Facility: CLINIC | Age: 54
End: 2019-11-14
Payer: COMMERCIAL

## 2019-11-14 ENCOUNTER — OFFICE VISIT (OUTPATIENT)
Dept: FAMILY MEDICINE CLINIC | Facility: CLINIC | Age: 54
End: 2019-11-14
Payer: COMMERCIAL

## 2019-11-14 VITALS
TEMPERATURE: 98.3 F | DIASTOLIC BLOOD PRESSURE: 74 MMHG | HEART RATE: 81 BPM | SYSTOLIC BLOOD PRESSURE: 122 MMHG | OXYGEN SATURATION: 99 % | WEIGHT: 122.2 LBS | HEIGHT: 60 IN | BODY MASS INDEX: 23.99 KG/M2

## 2019-11-14 VITALS
HEIGHT: 60 IN | DIASTOLIC BLOOD PRESSURE: 78 MMHG | HEART RATE: 78 BPM | SYSTOLIC BLOOD PRESSURE: 124 MMHG | BODY MASS INDEX: 24.07 KG/M2 | WEIGHT: 122.6 LBS

## 2019-11-14 DIAGNOSIS — Z12.4 ENCOUNTER FOR PAPANICOLAOU SMEAR FOR CERVICAL CANCER SCREENING: ICD-10-CM

## 2019-11-14 DIAGNOSIS — J06.9 UPPER RESPIRATORY TRACT INFECTION, UNSPECIFIED TYPE: Primary | ICD-10-CM

## 2019-11-14 DIAGNOSIS — N95.2 ATROPHIC VAGINITIS: Primary | ICD-10-CM

## 2019-11-14 DIAGNOSIS — R05.9 COUGH: ICD-10-CM

## 2019-11-14 DIAGNOSIS — J32.9 SINUSITIS, UNSPECIFIED CHRONICITY, UNSPECIFIED LOCATION: ICD-10-CM

## 2019-11-14 PROCEDURE — 99213 OFFICE O/P EST LOW 20 MIN: CPT | Performed by: FAMILY MEDICINE

## 2019-11-14 PROCEDURE — G0145 SCR C/V CYTO,THINLAYER,RESCR: HCPCS | Performed by: OBSTETRICS & GYNECOLOGY

## 2019-11-14 PROCEDURE — 1036F TOBACCO NON-USER: CPT | Performed by: FAMILY MEDICINE

## 2019-11-14 PROCEDURE — S0612 ANNUAL GYNECOLOGICAL EXAMINA: HCPCS | Performed by: OBSTETRICS & GYNECOLOGY

## 2019-11-14 RX ORDER — ESTRADIOL 0.1 MG/G
CREAM VAGINAL
Qty: 42.5 G | Refills: 3 | Status: SHIPPED | OUTPATIENT
Start: 2019-11-14

## 2019-11-14 RX ORDER — AZITHROMYCIN 250 MG/1
TABLET, FILM COATED ORAL
Qty: 6 TABLET | Refills: 0 | Status: SHIPPED | OUTPATIENT
Start: 2019-11-14 | End: 2019-11-19

## 2019-11-14 NOTE — PROGRESS NOTES
Assessment/Plan:         Diagnoses and all orders for this visit:    Atrophic vaginitis  -     estradiol (ESTRACE) 0 1 mg/g vaginal cream; 1 gram vaginally M,W,F x 3 weeks then once weekly    Encounter for Papanicolaou smear for cervical cancer screening  -     Liquid-based pap, screening        Subjective:      Patient ID: Gisela Hector is a 62 y o  female  HPI  patient presents to the office for annual examination  Last menstrual period was approximately a year ago  She has tolerated the vasomotor symptoms  Her only complaint is that of vaginal dryness and dyspareunia  She denies any urinary or GI complaints  Colonoscopy at age 44  Next due at age 62  See here scan in May of 2017 +0 6    The following portions of the patient's history were reviewed and updated as appropriate:   She  has a past medical history of Abdominal pain and Wears glasses  She There are no active problems to display for this patient  She  has a past surgical history that includes  section; Toe Surgery; [place] tooth extraction; Polypectomy; Colonoscopy; and pr lap,diagnostic abdomen (N/A, 3/13/2017)  Her family history includes Brain cancer in her maternal grandfather; Breast cancer in her maternal aunt; Cancer in her maternal aunt; Lung cancer in her maternal aunt; No Known Problems in her father, maternal grandmother, mother, paternal grandfather, paternal grandmother, and sister  She  reports that she has never smoked  She has never used smokeless tobacco  She reports that she drinks alcohol  She reports that she does not use drugs  Current Outpatient Medications   Medication Sig Dispense Refill    estradiol (ESTRACE) 0 1 mg/g vaginal cream 1 gram vaginally M,W,F x 3 weeks then once weekly 42 5 g 3     No current facility-administered medications for this visit  No current outpatient medications on file prior to visit  No current facility-administered medications on file prior to visit        She has No Known Allergies       Review of Systems   Constitutional: Negative  HENT: Negative for sore throat and trouble swallowing  Gastrointestinal: Negative  Genitourinary: Positive for dyspareunia  Negative for difficulty urinating, dysuria, enuresis, frequency, genital sores, hematuria, menstrual problem, pelvic pain, urgency and vaginal discharge  Objective:      /78 (BP Location: Right arm)   Pulse 78   Ht 5' (1 524 m)   Wt 55 6 kg (122 lb 9 6 oz)   LMP 02/20/2017 Comment: urine hcg negative  BMI 23 94 kg/m²          Physical Exam   Constitutional: She appears well-developed and well-nourished  Neck: Normal range of motion  Neck supple  No thyromegaly present  Cardiovascular: Normal rate, regular rhythm and normal heart sounds  Pulmonary/Chest: Effort normal and breath sounds normal  No respiratory distress  Right breast exhibits no inverted nipple, no mass, no nipple discharge, no skin change and no tenderness  Left breast exhibits no inverted nipple, no mass, no nipple discharge, no skin change and no tenderness  Abdominal: Soft  Bowel sounds are normal  She exhibits no distension and no mass  There is no tenderness  There is no rebound and no guarding  No hernia  Hernia confirmed negative in the right inguinal area and confirmed negative in the left inguinal area  Genitourinary: There is no rash, tenderness or lesion on the right labia  There is no rash, tenderness or lesion on the left labia  Uterus is not deviated, not enlarged, not fixed and not tender  Cervix exhibits no motion tenderness, no discharge and no friability  Right adnexum displays no mass, no tenderness and no fullness  Left adnexum displays no mass, no tenderness and no fullness  No erythema, tenderness or bleeding in the vagina  No vaginal discharge found  Lymphadenopathy:     She has no cervical adenopathy  No inguinal adenopathy noted on the right or left side

## 2019-11-14 NOTE — PROGRESS NOTES
Assessment/Plan:  Chief Complaint   Patient presents with    Cold Like Symptoms     3 weeks of blocked ears, runny nose, nasal congestion, cough     There are no Patient Instructions on file for this visit  No problem-specific Assessment & Plan notes found for this encounter  Diagnoses and all orders for this visit:    Upper respiratory tract infection, unspecified type  -     azithromycin (ZITHROMAX) 250 mg tablet; Take 2 tablets today then 1 tablet daily x 4 days    Cough  -     azithromycin (ZITHROMAX) 250 mg tablet; Take 2 tablets today then 1 tablet daily x 4 days    Sinusitis, unspecified chronicity, unspecified location  -     azithromycin (ZITHROMAX) 250 mg tablet; Take 2 tablets today then 1 tablet daily x 4 days          Subjective:      Patient ID: Navi Winn is a 62 y o  female  Cold Like Symptoms (3 weeks of blocked ears, runny nose, nasal congestion, cough) No cp or sob, or ha  The following portions of the patient's history were reviewed and updated as appropriate: allergies, current medications, past family history, past medical history, past social history, past surgical history and problem list     Review of Systems   Constitutional: Negative  HENT: Positive for congestion and rhinorrhea  Eyes: Negative  Respiratory: Positive for cough  Cardiovascular: Negative  Gastrointestinal: Negative  Endocrine: Negative  Genitourinary: Negative  Musculoskeletal: Negative  Skin: Negative  Allergic/Immunologic: Negative  Neurological: Negative  Hematological: Negative  Psychiatric/Behavioral: Negative  Objective:      /74   Pulse 81   Temp 98 3 °F (36 8 °C) (Temporal)   Ht 5' (1 524 m)   Wt 55 4 kg (122 lb 3 2 oz)   LMP 02/20/2017 Comment: urine hcg negative  SpO2 99%   BMI 23 87 kg/m²          Physical Exam   Constitutional: She is oriented to person, place, and time  She appears well-developed and well-nourished     HENT: Head: Normocephalic and atraumatic  Right Ear: External ear normal    Left Ear: External ear normal    pnd and rhinorrhea   Eyes: Pupils are equal, round, and reactive to light  Conjunctivae and EOM are normal    Neck: Normal range of motion  Neck supple  Cardiovascular: Normal rate, regular rhythm, normal heart sounds and intact distal pulses  Pulmonary/Chest: Effort normal and breath sounds normal    cough   Musculoskeletal: Normal range of motion  Neurological: She is alert and oriented to person, place, and time  She has normal reflexes  Skin: Skin is warm and dry  Psychiatric: She has a normal mood and affect   Her behavior is normal

## 2019-11-19 LAB
LAB AP GYN PRIMARY INTERPRETATION: NORMAL
Lab: NORMAL

## 2021-02-18 DIAGNOSIS — Z12.31 ENCOUNTER FOR SCREENING MAMMOGRAM FOR BREAST CANCER: Primary | ICD-10-CM

## 2021-03-02 ENCOUNTER — ANNUAL EXAM (OUTPATIENT)
Dept: GYNECOLOGY | Facility: CLINIC | Age: 56
End: 2021-03-02
Payer: COMMERCIAL

## 2021-03-02 VITALS
HEART RATE: 94 BPM | BODY MASS INDEX: 22.09 KG/M2 | WEIGHT: 117 LBS | HEIGHT: 61 IN | DIASTOLIC BLOOD PRESSURE: 70 MMHG | SYSTOLIC BLOOD PRESSURE: 110 MMHG

## 2021-03-02 DIAGNOSIS — Z13.820 SCREENING FOR OSTEOPOROSIS: ICD-10-CM

## 2021-03-02 DIAGNOSIS — Z12.4 ENCOUNTER FOR PAPANICOLAOU SMEAR FOR CERVICAL CANCER SCREENING: Primary | ICD-10-CM

## 2021-03-02 DIAGNOSIS — N95.2 ATROPHIC VAGINITIS: ICD-10-CM

## 2021-03-02 PROCEDURE — S0612 ANNUAL GYNECOLOGICAL EXAMINA: HCPCS | Performed by: OBSTETRICS & GYNECOLOGY

## 2021-03-02 PROCEDURE — G0145 SCR C/V CYTO,THINLAYER,RESCR: HCPCS | Performed by: OBSTETRICS & GYNECOLOGY

## 2021-03-02 PROCEDURE — 76977 US BONE DENSITY MEASURE: CPT | Performed by: OBSTETRICS & GYNECOLOGY

## 2021-03-02 NOTE — PROGRESS NOTES
Assessment/Plan:    No problem-specific Assessment & Plan notes found for this encounter  Diagnoses and all orders for this visit:    Encounter for Papanicolaou smear for cervical cancer screening  -     Liquid-based pap, screening    Screening for osteoporosis: heel scan : +0 2    Atrophic vaginitis: using OTC moisturizer        Subjective:      Patient ID: Ba Fisher is a 60 y o  female  HPI  patient presents for annual examination  She offers no complaints  Denies any vaginal irritation burning discharge or bleeding  Denies any dysuria, hematuria urgency or stress urinary incontinence no GI complaints  For atrophic vaginitis she has been using over-the-counter vaginal moisturizer  This is working well for her and she would like to continue on this  Colonoscopy at age 44 repeated age 62  The following portions of the patient's history were reviewed and updated as appropriate:   She  has a past medical history of Abdominal pain and Wears glasses  She There are no active problems to display for this patient  She  has a past surgical history that includes  section; Toe Surgery; [place] tooth extraction; Polypectomy; Colonoscopy; and pr lap,diagnostic abdomen (N/A, 3/13/2017)  Her family history includes Brain cancer in her maternal grandfather; Breast cancer in her maternal aunt; Cancer in her maternal aunt; Lung cancer in her maternal aunt; No Known Problems in her father, maternal grandmother, mother, paternal grandfather, paternal grandmother, and sister  She  reports that she has never smoked  She has never used smokeless tobacco  She reports current alcohol use  She reports that she does not use drugs    Current Outpatient Medications   Medication Sig Dispense Refill    estradiol (ESTRACE) 0 1 mg/g vaginal cream 1 gram vaginally M,W,F x 3 weeks then once weekly (Patient not taking: Reported on 2019) 42 5 g 3     No current facility-administered medications for this visit  Current Outpatient Medications on File Prior to Visit   Medication Sig    estradiol (ESTRACE) 0 1 mg/g vaginal cream 1 gram vaginally M,W,F x 3 weeks then once weekly (Patient not taking: Reported on 11/14/2019)     No current facility-administered medications on file prior to visit  She has No Known Allergies       Review of Systems   Constitutional: Negative  HENT: Negative for sore throat and trouble swallowing  Gastrointestinal: Negative  Genitourinary: Negative  Objective:      /70 (BP Location: Left arm)   Pulse 94   Ht 5' 0 5" (1 537 m)   Wt 53 1 kg (117 lb)   LMP 02/20/2017 Comment: urine hcg negative  BMI 22 47 kg/m²          Physical Exam  Vitals signs reviewed  Constitutional:       Appearance: Normal appearance  She is normal weight  Neck:      Musculoskeletal: Normal range of motion and neck supple  No muscular tenderness  Cardiovascular:      Rate and Rhythm: Normal rate and regular rhythm  Pulses: Normal pulses  Heart sounds: Normal heart sounds  No murmur  Pulmonary:      Effort: Pulmonary effort is normal  No respiratory distress  Chest:      Breasts:         Right: No swelling, bleeding, inverted nipple, mass, nipple discharge, skin change or tenderness  Left: No swelling, bleeding, inverted nipple, mass, nipple discharge, skin change or tenderness  Abdominal:      General: Abdomen is flat  There is no distension  Palpations: Abdomen is soft  There is no mass  Tenderness: There is no abdominal tenderness  There is no guarding or rebound  Hernia: No hernia is present  There is no hernia in the left inguinal area or right inguinal area  Genitourinary:     General: Normal vulva  Labia:         Right: No rash, tenderness or lesion  Left: No rash, tenderness or lesion  Vagina: Normal       Cervix: Normal       Uterus: Normal        Adnexa:         Right: No mass, tenderness or fullness  Left: No mass, tenderness or fullness  Lymphadenopathy:      Cervical: No cervical adenopathy  Upper Body:      Right upper body: No supraclavicular, axillary or pectoral adenopathy  Left upper body: No supraclavicular, axillary or pectoral adenopathy  Lower Body: No right inguinal adenopathy  No left inguinal adenopathy  Neurological:      Mental Status: She is alert

## 2021-03-04 ENCOUNTER — HOSPITAL ENCOUNTER (OUTPATIENT)
Dept: MAMMOGRAPHY | Facility: MEDICAL CENTER | Age: 56
Discharge: HOME | End: 2021-03-04
Payer: COMMERCIAL

## 2021-03-04 VITALS — BODY MASS INDEX: 22.09 KG/M2 | WEIGHT: 117 LBS | HEIGHT: 61 IN

## 2021-03-04 DIAGNOSIS — Z12.31 ENCOUNTER FOR SCREENING MAMMOGRAM FOR BREAST CANCER: ICD-10-CM

## 2021-03-04 PROCEDURE — 77063 BREAST TOMOSYNTHESIS BI: CPT

## 2021-03-04 PROCEDURE — 77067 SCR MAMMO BI INCL CAD: CPT

## 2021-03-05 LAB
LAB AP GYN PRIMARY INTERPRETATION: NORMAL
Lab: NORMAL

## 2023-02-14 ENCOUNTER — RA CDI HCC (OUTPATIENT)
Dept: OTHER | Facility: HOSPITAL | Age: 58
End: 2023-02-14

## 2023-02-14 NOTE — PROGRESS NOTES
[facility] 75  coding opportunities       Chart reviewed, no opportunity found: CHART REVIEWED, NO OPPORTUNITY FOUND      This is a reminder to address ALL HCC (risk adjustment) codes as found on active problem list for 2023 as patient scores reset to zero MARISELA      Patients Insurance        Commercial Insurance: [address] [place] [address]

## 2023-02-21 ENCOUNTER — OFFICE VISIT (OUTPATIENT)
Dept: FAMILY MEDICINE CLINIC | Facility: CLINIC | Age: 58
End: 2023-02-21

## 2023-02-21 DIAGNOSIS — Z12.31 ENCOUNTER FOR SCREENING MAMMOGRAM FOR BREAST CANCER: ICD-10-CM

## 2023-02-21 DIAGNOSIS — Z00.00 WELL ADULT ON ROUTINE HEALTH CHECK: Primary | ICD-10-CM

## 2023-02-21 NOTE — PROGRESS NOTES
[address] [facility] PR[facility] CARE    NAME: Kostas Garcia  AGE: 59 y o  SEX: female  : 1965     DATE: 2023     Assessment and Plan:   Raphael Fernandez was seen today for well check  Diagnoses and all orders for this visit:    Well adult on routine health check    Encounter for screening mammogram for breast cancer  -     Mammo screening bilateral w 3d & cad; Future      Problem List Items Addressed This Visit    None  Visit Diagnoses     Well adult on routine health check    -  Primary    Encounter for screening mammogram for breast cancer        Relevant Orders    Mammo screening bilateral w 3d & cad          Immunizations and preventive care screenings were discussed with patient today  Appropriate education was printed on patient's after visit summary  Counseling:  Alcohol/drug use: discussed moderation in alcohol intake, the recommendations for healthy alcohol use  Dental Health: discussed importance of regular tooth brushing, flossing, and dental visits  Injury prevention: discussed safety/seat belts, safety helmets, smoke detectors, carbon dioxide detectors  Sexual health: Postmenopausal  · Exercise: the importance of regular exercise/physical activity was discussed  Recommend exercise 5 times per week for at least 60 minutes  Depression Screening and Follow-up Plan: Patient was screened for depression during today's encounter  They screened negative with a PHQ-2 score of 0  Return in about 1 year (around 2024) for Annual physical      Chief Complaint:     Chief Complaint   Patient presents with   • Well Check     Pt did not get Covid vaccine  Pt needs script for mammogram        History of Present Illness:     Adult Annual Physical   Patient here for a comprehensive physical exam  The patient reports no problems  Patient needed forms filled out for  [employer]      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    Diet and Physical Activity  · Diet/Nutrition: Could do better  · Exercise: no formal exercise  Depression Screening  PHQ-2/9 Depression Screening    Little interest or pleasure in doing things: 0 - not at all  Feeling down, depressed, or hopeless: 0 - not at all  PHQ-2 Score: 0  PHQ-2 Interpretation: Negative depression screen       General Health  · Sleep: sleeps well  · Hearing: normal - bilateral   · Vision: no vision problems and wears glasses  · Dental: regular dental visits  /GYN Health  Patient had been seen in  by Dr Cristina Cruz, DO-  Had extensive blood work hormonal levels negative  · Patient is: postmenopausal  · Last menstrual period: age 62  OB History    Para Term  AB Living   2 2 2 0 0 0   SAB IAB Ectopic Multiple Live Births   0 0 0 0 0     C section  Menarche=10  OC's in her 19's    Last Pap Dr Lorena Moore   Overdue for mammography   Review of Systems:     Review of Systems   Respiratory: Negative for shortness of breath  Cardiovascular: Negative for chest pain  Gastrointestinal: Negative  Musculoskeletal: Negative  Psychiatric/Behavioral: Negative  All other systems reviewed and are negative  Past Medical History:     Past Medical History:   Diagnosis Date   • Abdominal pain     had recently- seen in ER   no abd pain presently- possible hernia pt states   • Wears glasses       Past Surgical History:     Past Surgical History:   Procedure Laterality Date   •  SECTION      x 2   • COLONOSCOPY     • POLYPECTOMY      endometrial    • [place] LAPS ABD PRTM&OMENTUM DX W/WO SPEC BR/WA SPX N/A 3/13/2017    Procedure: Umbilical hernia repair Diagnostic laparoscopy;  Surgeon: Raúl Shipley MD;  Location: [facility];  Service: General   • TOE SURGERY      lazaro feet   • WISDOM TOOTH EXTRACTION        Social History:     Social History     Socioeconomic History   • Marital status: /Civil Union     Spouse name: None   • Number of children: None   • Years of education: None   • Highest education level: None   Occupational History     Employer: [employer]   Tobacco Use   • Smoking status: Never   • Smokeless tobacco: Never   Vaping Use   • Vaping Use: Never used   Substance and Sexual Activity   • Alcohol use: Yes     Comment: 2x wk    • Drug use: No   • Sexual activity: Yes     Birth control/protection: Post-menopausal   Other Topics Concern   • None   Social History Narrative   • None     Social Determinants of Health     Financial Resource Strain: Not on file   Food Insecurity: Not on file   Transportation Needs: Not on file   Physical Activity: Not on file   Stress: Not on file   Social Connections: Not on file   Intimate Partner Violence: Not on file   Housing Stability: Not on file      Family History:     Family History   Problem Relation Age of Onset   • Pulmonary embolism Mother    • Mental illness Father    • Depression Father    • Heart Valve Disease Father    • No Known Problems Sister    • Stroke Maternal Grandmother    • Brain cancer Maternal Grandfather    • No Known Problems Paternal Grandmother    • No Known Problems Paternal Grandfather    • Breast cancer Maternal Aunt    • Lung cancer Maternal Aunt    • Cancer Maternal Aunt       Current Medications:     No current outpatient medications on file  No current facility-administered medications for this visit  Allergies:     No Known Allergies   Physical Exam:     /68   Pulse 91   Temp 97 6 °F (36 4 °C) (Temporal)   Resp 16   Ht 5' (1 524 m)   Wt 54 4 kg (120 lb)   LMP 02/20/2017 Comment: urine hcg negative  SpO2 99%   BMI 23 44 kg/m²     Physical Exam  Vitals and nursing note reviewed  Constitutional:       General: She is not in acute distress  Appearance: She is well-developed  HENT:      Head: Normocephalic        Right Ear: Tympanic membrane normal       Left Ear: Tympanic membrane normal       Nose: Nose normal  Mouth/Throat:      Mouth: Mucous membranes are moist    Eyes:      Conjunctiva/sclera: Conjunctivae normal    Neck:      Vascular: No carotid bruit  Cardiovascular:      Rate and Rhythm: Normal rate and regular rhythm  Heart sounds: No murmur heard  Pulmonary:      Effort: Pulmonary effort is normal  No respiratory distress  Breath sounds: Normal breath sounds  Abdominal:      General: Bowel sounds are normal       Palpations: Abdomen is soft  Tenderness: There is no abdominal tenderness  Musculoskeletal:         General: No swelling  Normal range of motion  Cervical back: Neck supple  Skin:     General: Skin is warm and dry  Neurological:      Mental Status: She is alert and oriented to person, place, and time  Psychiatric:         Mood and Affect: Mood normal          Behavior: Behavior normal          Thought Content:  Thought content normal          Judgment: Judgment normal           DO ARIK Armenta [facility]

## 2023-02-26 VITALS
HEART RATE: 91 BPM | BODY MASS INDEX: 23.56 KG/M2 | DIASTOLIC BLOOD PRESSURE: 68 MMHG | WEIGHT: 120 LBS | SYSTOLIC BLOOD PRESSURE: 112 MMHG | RESPIRATION RATE: 16 BRPM | HEIGHT: 60 IN | TEMPERATURE: 97.6 F | OXYGEN SATURATION: 99 %

## 2023-07-27 ENCOUNTER — VBI (OUTPATIENT)
Dept: ADMINISTRATIVE | Facility: OTHER | Age: 58
End: 2023-07-27

## 2024-04-11 ENCOUNTER — VBI (OUTPATIENT)
Dept: ADMINISTRATIVE | Facility: OTHER | Age: 59
End: 2024-04-11

## 2024-05-14 ENCOUNTER — VBI (OUTPATIENT)
Dept: ADMINISTRATIVE | Facility: OTHER | Age: 59
End: 2024-05-14

## 2024-08-05 ENCOUNTER — VBI (OUTPATIENT)
Dept: ADMINISTRATIVE | Facility: OTHER | Age: 59
End: 2024-08-05

## 2024-08-05 NOTE — TELEPHONE ENCOUNTER
08/05/24 3:16 PM     Chart reviewed for Pap Smear (HPV) aka Cervical Cancer Screening ; nothing is submitted to the patient's insurance at this time.     Quyen Carey MA   [facility]

## 2024-12-11 ENCOUNTER — VBI (OUTPATIENT)
Dept: ADMINISTRATIVE | Facility: OTHER | Age: 59
End: 2024-12-11

## 2024-12-11 NOTE — TELEPHONE ENCOUNTER
12/11/24 6:38 PM     Chart reviewed for   Cervical Cancer Screening    ; nothing is submitted to the patient's insurance at this time.     COLEMAN COBIAN MA   [facility]

## 2025-03-18 ENCOUNTER — VBI (OUTPATIENT)
Dept: ADMINISTRATIVE | Facility: OTHER | Age: 60
End: 2025-03-18

## 2025-03-18 NOTE — TELEPHONE ENCOUNTER
03/18/25 8:16 AM     Chart reviewed for   Cervical Cancer Screening    ; nothing is submitted to the patient's insurance at this time.     COLEMAN COBIAN MA   [facility]

## 2025-07-02 ENCOUNTER — TELEPHONE (OUTPATIENT)
Dept: FAMILY MEDICINE CLINIC | Facility: CLINIC | Age: 60
End: 2025-07-02

## (undated) DEVICE — REM POLYHESIVE ADULT PATIENT RETURN ELECTRODE: Brand: VALLEYLAB

## (undated) DEVICE — ADHESIVE SKN CLSR HISTOACRYL FLEX 0.5ML LF

## (undated) DEVICE — ENDOPATH XCEL UNIVERSAL TROCAR STABLILITY SLEEVES: Brand: ENDOPATH XCEL

## (undated) DEVICE — BLUE HEAT SCOPE WARMER

## (undated) DEVICE — GLOVE INDICATOR PI UNDERGLOVE SZ 7 BLUE

## (undated) DEVICE — ALLENTOWN LAP CHOLE APP PACK: Brand: CARDINAL HEALTH

## (undated) DEVICE — LAP AEM SCISSOR TIP .75 IN

## (undated) DEVICE — SUT VICRYL 3-0 SH 27 IN J416H

## (undated) DEVICE — GLOVE INDICATOR PI UNDERGLOVE SZ 8 BLUE

## (undated) DEVICE — 3000CC GUARDIAN II: Brand: GUARDIAN

## (undated) DEVICE — DRAPE FLUID WARMER (BIRD BATH)

## (undated) DEVICE — INTENDED FOR TISSUE SEPARATION, AND OTHER PROCEDURES THAT REQUIRE A SHARP SURGICAL BLADE TO PUNCTURE OR CUT.: Brand: BARD-PARKER SAFETY BLADES SIZE 11, STERILE

## (undated) DEVICE — SUT MONOCRYL 4-0 PS-2 27 IN Y426H

## (undated) DEVICE — SCD SEQUENTIAL COMPRESSION COMFORT SLEEVE MEDIUM KNEE LENGTH: Brand: KENDALL SCD

## (undated) DEVICE — ENDOPATH XCEL BLADELESS TROCARS WITH STABILITY SLEEVES: Brand: ENDOPATH XCEL

## (undated) DEVICE — INTENDED FOR TISSUE SEPARATION, AND OTHER PROCEDURES THAT REQUIRE A SHARP SURGICAL BLADE TO PUNCTURE OR CUT.: Brand: BARD-PARKER SAFETY BLADES SIZE 10, STERILE

## (undated) DEVICE — GLOVE SRG BIOGEL 7

## (undated) DEVICE — 2000CC GUARDIAN II: Brand: GUARDIAN

## (undated) DEVICE — GLOVE SRG BIOGEL ECLIPSE 7.5